# Patient Record
Sex: FEMALE | Race: BLACK OR AFRICAN AMERICAN | NOT HISPANIC OR LATINO | Employment: FULL TIME | URBAN - METROPOLITAN AREA
[De-identification: names, ages, dates, MRNs, and addresses within clinical notes are randomized per-mention and may not be internally consistent; named-entity substitution may affect disease eponyms.]

---

## 2019-08-07 PROBLEM — N85.2 ENLARGED UTERUS: Chronic | Status: ACTIVE | Noted: 2019-08-07

## 2019-08-07 PROBLEM — N92.0 MENORRHAGIA: Status: ACTIVE | Noted: 2019-08-07

## 2019-08-07 PROBLEM — D21.9 FIBROIDS: Chronic | Status: ACTIVE | Noted: 2019-08-07

## 2019-08-07 PROBLEM — D21.9 FIBROIDS: Chronic | Status: RESOLVED | Noted: 2019-08-07 | Resolved: 2019-08-07

## 2019-08-07 PROBLEM — N92.0 MENORRHAGIA: Status: RESOLVED | Noted: 2019-08-07 | Resolved: 2019-08-07

## 2019-08-07 PROBLEM — N85.2 ENLARGED UTERUS: Chronic | Status: RESOLVED | Noted: 2019-08-07 | Resolved: 2019-08-07

## 2019-08-08 PROBLEM — N92.0 MENORRHAGIA: Status: RESOLVED | Noted: 2019-08-07 | Resolved: 2019-08-08

## 2021-06-11 ENCOUNTER — OCCUPATIONAL HEALTH (OUTPATIENT)
Dept: URGENT CARE | Facility: CLINIC | Age: 46
End: 2021-06-11

## 2021-06-11 DIAGNOSIS — Z02.1 PRE-EMPLOYMENT EXAMINATION: Primary | ICD-10-CM

## 2021-06-11 PROCEDURE — 97750 PHYSICAL PERFORMANCE TEST: CPT | Mod: S$GLB,,, | Performed by: NURSE PRACTITIONER

## 2021-06-11 PROCEDURE — 80305 DRUG TEST PRSMV DIR OPT OBS: CPT | Mod: S$GLB,,, | Performed by: NURSE PRACTITIONER

## 2021-06-11 PROCEDURE — 80305 OOH COLLECTION ONLY DRUG SCREEN: ICD-10-PCS | Mod: S$GLB,,, | Performed by: NURSE PRACTITIONER

## 2021-06-11 PROCEDURE — 99499 PHYSICAL, BASIC COMPLEXITY: ICD-10-PCS | Mod: S$GLB,,, | Performed by: NURSE PRACTITIONER

## 2021-06-11 PROCEDURE — 99499 UNLISTED E&M SERVICE: CPT | Mod: S$GLB,,, | Performed by: NURSE PRACTITIONER

## 2021-06-11 PROCEDURE — 97750 LIFT TEST: ICD-10-PCS | Mod: S$GLB,,, | Performed by: NURSE PRACTITIONER

## 2023-05-26 ENCOUNTER — NURSE TRIAGE (OUTPATIENT)
Dept: ADMINISTRATIVE | Facility: CLINIC | Age: 48
End: 2023-05-26

## 2023-05-26 NOTE — TELEPHONE ENCOUNTER
Error encounter          Reason for Disposition   Caller has cancelled the call before the first contact    Protocols used: No Contact or Duplicate Contact Call-A-[place]